# Patient Record
Sex: FEMALE | URBAN - METROPOLITAN AREA
[De-identification: names, ages, dates, MRNs, and addresses within clinical notes are randomized per-mention and may not be internally consistent; named-entity substitution may affect disease eponyms.]

---

## 2020-10-08 ENCOUNTER — ANTICOAGULATION MONITORING (OUTPATIENT)
Dept: MEDICAL GROUP | Facility: MEDICAL CENTER | Age: 85
End: 2020-10-08

## 2020-10-08 DIAGNOSIS — I48.0 PAROXYSMAL ATRIAL FIBRILLATION (HCC): ICD-10-CM

## 2020-10-08 LAB — INR PPP: 1.5 (ref 2–3.5)

## 2020-10-08 NOTE — PROGRESS NOTES
Pt is visiting from [facility] with her , Danial Crump, who was being seen by Dr. Michelle Lazo. Pt may be leaving for [place] next week.  Pt is on warfarin for atrial fibrillation and self-manages.  Provided one time POCT INR for courtesy.    Pt cannot have POCT done since she has not been established with [facility] PCP.  Pt will need lab draw for PT/INR (order from her cardiology Dr Juaquin Mascorro) will be scanned in media    Elodia Peoples, MgD

## 2020-10-15 ENCOUNTER — APPOINTMENT (OUTPATIENT)
Dept: MEDICAL GROUP | Facility: MEDICAL CENTER | Age: 85
End: 2020-10-15

## 2020-10-15 ENCOUNTER — ANTICOAGULATION MONITORING (OUTPATIENT)
Dept: MEDICAL GROUP | Facility: MEDICAL CENTER | Age: 85
End: 2020-10-15

## 2020-10-15 DIAGNOSIS — I48.0 PAROXYSMAL ATRIAL FIBRILLATION (HCC): ICD-10-CM

## 2020-10-15 LAB — INR PPP: 1.8 (ref 2–3.5)

## 2020-10-16 NOTE — PROGRESS NOTES
OP Telephone Anticoagulation Service Note    Date: 10/15/2020      Anticoagulation Summary  As of 10/15/2020    INR goal:  2.0-3.0   TTR:  --   INR used for dosin.80 (10/15/2020)   Warfarin maintenance plan:  5 mg (5 mg x 1) every day   Weekly warfarin total:  35 mg   Plan last modified:  Mg ZaragozaD (10/8/2020)   Next INR check:  10/22/2020   Target end date:  Indefinite    Indications    Paroxysmal atrial fibrillation (HCC) [I48.0]             Anticoagulation Episode Summary     INR check location:      Preferred lab:      Send INR reminders to:      Comments:  Ref: Juaquin Mascorro MD        Anticoagulation Patient Findings        Plan: INR is subtherapeutic today. Confirmed dose. She is here on vacation from [place], returning next week. She has been eating more salads since being here. Advised to take 7.5 mg today then resume usual regimen. Advised INR next week when back in [place].           Shahnaz Ramirez, PharmD

## 2020-12-15 ENCOUNTER — TELEPHONE (OUTPATIENT)
Dept: VASCULAR LAB | Facility: MEDICAL CENTER | Age: 85
End: 2020-12-15

## 2020-12-15 NOTE — TELEPHONE ENCOUNTER
Unable to leavemessage for pt to have INR checked.  No contact information in Epic, no address   Gregoria Barnard, Clinical Pharmacist, CDE, CACP

## 2021-01-27 ENCOUNTER — ANTICOAGULATION MONITORING (OUTPATIENT)
Dept: VASCULAR LAB | Facility: MEDICAL CENTER | Age: 86
End: 2021-01-27

## 2021-01-27 DIAGNOSIS — I48.0 PAROXYSMAL ATRIAL FIBRILLATION (HCC): ICD-10-CM

## 2021-01-27 NOTE — PROGRESS NOTES
Discharged from [facility].  Pt is managed by PCP in [place].  Gregoria Barnard, Clinical Pharmacist, CDE, CACP